# Patient Record
Sex: FEMALE | Race: BLACK OR AFRICAN AMERICAN
[De-identification: names, ages, dates, MRNs, and addresses within clinical notes are randomized per-mention and may not be internally consistent; named-entity substitution may affect disease eponyms.]

---

## 2017-12-07 ENCOUNTER — HOSPITAL ENCOUNTER (OUTPATIENT)
Dept: HOSPITAL 61 - ENDOS | Age: 72
Discharge: HOME | End: 2017-12-07
Attending: INTERNAL MEDICINE
Payer: MEDICARE

## 2017-12-07 VITALS — SYSTOLIC BLOOD PRESSURE: 167 MMHG | DIASTOLIC BLOOD PRESSURE: 75 MMHG

## 2017-12-07 DIAGNOSIS — K31.84: ICD-10-CM

## 2017-12-07 DIAGNOSIS — K57.30: ICD-10-CM

## 2017-12-07 DIAGNOSIS — Z80.0: ICD-10-CM

## 2017-12-07 DIAGNOSIS — E78.5: ICD-10-CM

## 2017-12-07 DIAGNOSIS — Z79.899: ICD-10-CM

## 2017-12-07 DIAGNOSIS — I10: ICD-10-CM

## 2017-12-07 DIAGNOSIS — K29.50: ICD-10-CM

## 2017-12-07 DIAGNOSIS — K64.0: ICD-10-CM

## 2017-12-07 DIAGNOSIS — Z12.11: Primary | ICD-10-CM

## 2017-12-07 DIAGNOSIS — K62.1: ICD-10-CM

## 2017-12-07 PROCEDURE — 43235 EGD DIAGNOSTIC BRUSH WASH: CPT

## 2017-12-07 PROCEDURE — 88305 TISSUE EXAM BY PATHOLOGIST: CPT

## 2017-12-07 RX ADMIN — SODIUM CHLORIDE, SODIUM LACTATE, POTASSIUM CHLORIDE, AND CALCIUM CHLORIDE 1 MLS/HR: .6; .31; .03; .02 INJECTION, SOLUTION INTRAVENOUS at 11:45

## 2019-09-23 ENCOUNTER — HOSPITAL ENCOUNTER (OUTPATIENT)
Dept: HOSPITAL 61 - MRI | Age: 74
Discharge: HOME | End: 2019-09-23
Attending: ORTHOPAEDIC SURGERY
Payer: MEDICARE
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
## 2019-09-23 DIAGNOSIS — M75.21: ICD-10-CM
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
## 2019-09-23 DIAGNOSIS — M89.38: ICD-10-CM

## 2019-09-23 DIAGNOSIS — S46.211A: Primary | ICD-10-CM

## 2019-09-23 PROCEDURE — 73221 MRI JOINT UPR EXTREM W/O DYE: CPT

## 2019-09-23 NOTE — RAD
MR of the right shoulder

 

HISTORY: Pain for 2 months. Limited range of motion.

 

TECHNIQUE: Routine multiplanar sequences are obtained.

 

FINDINGS:

Acromioclavicular joint is mildly degenerative. Postsurgical changes at 

the rotator cuff and humeral head compatible with prior rotator cuff 

surgery. There is some bone hypertrophy and slight fragmentation at the 

greater tuberosity which appears chronic. Although rotator cuff is 

partially obscured by artifact, no evidence of significant recurrent 

supraspinatus or infraspinatus tendon rupture or retraction. Subscapularis

tendinosis with some possible mild interstitial tearing. Small subdeltoid 

bursal effusion. This fluid also enters the acromioclavicular joint.

 

Mild degenerative changes at the glenohumeral joint. Mild heterogeneity of

signal within the superior labrum compatible with degeneration. No 

evidence of labral detachment or separation.

 

Biceps tendinosis with partial tearing. Slight medial subluxation.

 

No acute fracture. No aggressive bone destruction. No acute soft tissue 

abnormality.

 

IMPRESSION:

1. Postsurgical changes of the rotator cuff with tendinosis. No 

significant recurrent supraspinatus or infraspinatus tendon rupture. 

Probable mild interstitial tearing of the subscapularis tendon.

2. Superior labral degeneration.

3. Biceps tendinosis with partial tearing and slight medial subluxation.

 

Electronically signed by: Santosh Gardner MD (9/23/2019 3:50 PM) [facility]

## 2019-10-22 ENCOUNTER — HOSPITAL ENCOUNTER (OUTPATIENT)
Dept: HOSPITAL 61 - SURG | Age: 74
Discharge: HOME | End: 2019-10-22
Attending: ORTHOPAEDIC SURGERY
Payer: MEDICARE

## 2019-10-22 VITALS — BODY MASS INDEX: 25.33 KG/M2 | WEIGHT: 152 LBS | HEIGHT: 65 IN

## 2019-10-22 VITALS — SYSTOLIC BLOOD PRESSURE: 164 MMHG | DIASTOLIC BLOOD PRESSURE: 70 MMHG

## 2019-10-22 DIAGNOSIS — Z98.41: ICD-10-CM

## 2019-10-22 DIAGNOSIS — K64.9: ICD-10-CM

## 2019-10-22 DIAGNOSIS — F17.210: ICD-10-CM

## 2019-10-22 DIAGNOSIS — Z90.710: ICD-10-CM

## 2019-10-22 DIAGNOSIS — M24.011: ICD-10-CM

## 2019-10-22 DIAGNOSIS — H40.9: ICD-10-CM

## 2019-10-22 DIAGNOSIS — Z98.42: ICD-10-CM

## 2019-10-22 DIAGNOSIS — Z96.1: ICD-10-CM

## 2019-10-22 DIAGNOSIS — F19.90: ICD-10-CM

## 2019-10-22 DIAGNOSIS — M66.321: Primary | ICD-10-CM

## 2019-10-22 DIAGNOSIS — E78.00: ICD-10-CM

## 2019-10-22 DIAGNOSIS — K21.9: ICD-10-CM

## 2019-10-22 DIAGNOSIS — Z72.89: ICD-10-CM

## 2019-10-22 DIAGNOSIS — I10: ICD-10-CM

## 2019-10-22 DIAGNOSIS — Z98.890: ICD-10-CM

## 2019-10-22 DIAGNOSIS — Z87.39: ICD-10-CM

## 2019-10-22 PROCEDURE — 23430 REPAIR BICEPS TENDON: CPT

## 2019-10-22 PROCEDURE — 29819 SHO ARTHRS SRG RMVL LOOSE/FB: CPT

## 2019-10-22 PROCEDURE — 93005 ELECTROCARDIOGRAM TRACING: CPT

## 2019-10-22 PROCEDURE — A7015 AEROSOL MASK USED W NEBULIZE: HCPCS

## 2019-10-22 NOTE — PDOC4
Operative Note


Operative Note


Date of surgery: 10/22/2019





Preoperative diagnosis: Right shoulder pain with suspected biceps fraying, 

history of previous rotator cuff repair





Postoperative diagnosis: Same with severe biceps fraying intact rotator cuff 

repair with only partial undersurface fraying and multiple loose bodies 

glenohumeral joint





Operative procedure: Right shoulder arthroscopy removal multiple loose bodies 

and open biceps tenodesis





Surgeon: Rosy





Assist: Marbin





Anesthesia: Gen.





Estimated blood loss: 10 mL





Complications: None





Operative indications: Please see my orthopedic clinic note for detailed 

operative indications and note that we covered addressing any pathological 

conditions found including the possibility of planned biceps tenodesis 

addressing any rotator cuff pathology or other pathology noted at the time of 

the procedure. I did emphasize I can't undo any degenerative condition in the 

shoulder and any ongoing symptoms from that would need to be managed 

symptomatically. There is a possibility of infection nerve or blood vessel 

damage medical or other anesthetic complications among others all her questions 

were answered she wishes to proceed with surgical evaluation and treatment.





Operative text: Patient was identified procedure verified patient placed in the 

supine position on the operating table. After adequate amounts of general 

anesthesia were administered she was placed in the beachchair position and all 

bony prominences were well-padded and the right shoulder was prepped and draped 

in standard sterile fashion with the spider arm rico. After timeout was 

performed patient procedure identified and verified a standard posterior portal 

was established an anterior portal established using spinal needle localization 

and the shoulder joint was systematically examined. She was noted to have mild 

fraying of the undersurface of the supraspinatus but otherwise an intact rotator

cuff repair after the significant fraying of the biceps anchor at the superior 

labrum were debrided back to stable tissue using arthroscopic shaver as well as 

the undersurface tear only partial thickness of the supraspinatus was debrided. 

Subscapularis appeared intact biceps was very frayed involving well over 25% of 

the tendon substance biceps tendon was tagged and tenotomized. She also had 

multiple loose bodies present in the inferior recess of the glenohumeral joint 

which were retrieved through the anterior portal and open incision was made 

anteriorly to complete the biceps tenodesis in the bicipital groove with a 2.9 

juggernaut Bi[employer] anchor. Biceps contour was maintained thorough irrigation 

carried out normal saline solution closure accomplished with buried Vicryl 

suture and subcuticular Monocryl sterile dressings were applied patient was 

placed in a sling extubated returned recovery room in stable condition having 

tolerated procedure well











MONA VALENTINO MD           Oct 22, 2019 13:01

## 2019-10-22 NOTE — EKG
[facility]

               [address] [place] [address]

Test Date:    2019-10-22               Test Time:    08:27:38

Pat Name:     SWETHA AVILA                Department:   

Patient ID:   [id number]           Room:          

Gender:       F                        Technician:   MORAIMA KHAN

:          1945               Requested By: MONA VALENTINO

Order Number: [id number]           Reading MD:     

                                 Measurements

Intervals                              Axis          

Rate:         67                       P:            48

CT:           166                      QRS:          -17

QRSD:         82                       T:            38

QT:           428                                    

QTc:          455                                    

                           Interpretive Statements

SINUS RHYTHM

LEFTWARD AXIS

NO SPECIFIC ECG ABNORMALITIES

RI6.01

No previous ECG available for comparison

## 2019-10-22 NOTE — DISCH
DISCHARGE INSTRUCTIONS


Condition on Discharge


Condition on Discharge:  Stable





Activity After Discharge


Activity Instructions for Disc:  Other, see below (sling for comfort to right 

shoulder and fine motor use of right hand with arm at side may eat do hair no 

lifting pushing pulling)





Diet after Discharge


Diet after Discharge:  Regular





Wound Incision Care


Wound/Incision Care:  Change dressing (remove dressing in 2 days may then 

shower)





Contacting the  after DC


Call your doctor for:  Concerns you may have





Follow-Up


Follow up with:  Dr. Gallegos 1 week











MONA GALLEGOS MD           Oct 22, 2019 12:54

## 2020-07-07 ENCOUNTER — HOSPITAL ENCOUNTER (INPATIENT)
Dept: HOSPITAL 61 - ER | Age: 75
Discharge: HOME | DRG: 149 | End: 2020-07-07
Attending: FAMILY MEDICINE | Admitting: FAMILY MEDICINE
Payer: MEDICARE

## 2020-07-07 VITALS — SYSTOLIC BLOOD PRESSURE: 175 MMHG | DIASTOLIC BLOOD PRESSURE: 70 MMHG

## 2020-07-07 VITALS — DIASTOLIC BLOOD PRESSURE: 55 MMHG | SYSTOLIC BLOOD PRESSURE: 108 MMHG

## 2020-07-07 VITALS — BODY MASS INDEX: 24.98 KG/M2 | WEIGHT: 149.91 LBS | HEIGHT: 65 IN

## 2020-07-07 VITALS — DIASTOLIC BLOOD PRESSURE: 51 MMHG | SYSTOLIC BLOOD PRESSURE: 117 MMHG

## 2020-07-07 DIAGNOSIS — J44.9: ICD-10-CM

## 2020-07-07 DIAGNOSIS — I10: ICD-10-CM

## 2020-07-07 DIAGNOSIS — I73.9: ICD-10-CM

## 2020-07-07 DIAGNOSIS — F17.200: ICD-10-CM

## 2020-07-07 DIAGNOSIS — R00.1: ICD-10-CM

## 2020-07-07 DIAGNOSIS — H81.10: Primary | ICD-10-CM

## 2020-07-07 LAB
ALBUMIN SERPL-MCNC: 4 G/DL (ref 3.4–5)
ALBUMIN/GLOB SERPL: 1 {RATIO} (ref 1–1.7)
ALP SERPL-CCNC: 98 U/L (ref 46–116)
ALT SERPL-CCNC: 19 U/L (ref 14–59)
AMORPH SED URNS QL MICRO: PRESENT /HPF
ANION GAP SERPL CALC-SCNC: 9 MMOL/L (ref 6–14)
APTT PPP: YELLOW S
AST SERPL-CCNC: 24 U/L (ref 15–37)
BACTERIA #/AREA URNS HPF: 0 /HPF
BASOPHILS # BLD AUTO: 0.1 X10^3/UL (ref 0–0.2)
BASOPHILS NFR BLD: 1 % (ref 0–3)
BILIRUB SERPL-MCNC: 0.2 MG/DL (ref 0.2–1)
BILIRUB UR QL STRIP: NEGATIVE
BUN SERPL-MCNC: 12 MG/DL (ref 7–20)
BUN/CREAT SERPL: 11 (ref 6–20)
CALCIUM SERPL-MCNC: 8.9 MG/DL (ref 8.5–10.1)
CHLORIDE SERPL-SCNC: 101 MMOL/L (ref 98–107)
CO2 SERPL-SCNC: 29 MMOL/L (ref 21–32)
CREAT SERPL-MCNC: 1.1 MG/DL (ref 0.6–1)
EOSINOPHIL NFR BLD: 0 % (ref 0–3)
EOSINOPHIL NFR BLD: 0 X10^3/UL (ref 0–0.7)
ERYTHROCYTE [DISTWIDTH] IN BLOOD BY AUTOMATED COUNT: 13.7 % (ref 11.5–14.5)
FIBRINOGEN PPP-MCNC: CLEAR MG/DL
GFR SERPLBLD BASED ON 1.73 SQ M-ARVRAT: 58.9 ML/MIN
GLOBULIN SER-MCNC: 3.9 G/DL (ref 2.2–3.8)
GLUCOSE SERPL-MCNC: 129 MG/DL (ref 70–99)
HCT VFR BLD CALC: 41.8 % (ref 36–47)
HGB BLD-MCNC: 14.5 G/DL (ref 12–15.5)
LYMPHOCYTES # BLD: 2.1 X10^3/UL (ref 1–4.8)
LYMPHOCYTES NFR BLD AUTO: 26 % (ref 24–48)
MCH RBC QN AUTO: 30 PG (ref 25–35)
MCHC RBC AUTO-ENTMCNC: 35 G/DL (ref 31–37)
MCV RBC AUTO: 88 FL (ref 79–100)
MONO #: 0.4 X10^3/UL (ref 0–1.1)
MONOCYTES NFR BLD: 5 % (ref 0–9)
NEUT #: 5.5 X10^3/UL (ref 1.8–7.7)
NEUTROPHILS NFR BLD AUTO: 67 % (ref 31–73)
NITRITE UR QL STRIP: NEGATIVE
PH UR STRIP: 7.5 [PH]
PLATELET # BLD AUTO: 300 X10^3/UL (ref 140–400)
POTASSIUM SERPL-SCNC: 3.3 MMOL/L (ref 3.5–5.1)
PROT SERPL-MCNC: 7.9 G/DL (ref 6.4–8.2)
PROT UR STRIP-MCNC: NEGATIVE MG/DL
RBC # BLD AUTO: 4.78 X10^6/UL (ref 3.5–5.4)
RBC #/AREA URNS HPF: (no result) /HPF (ref 0–2)
SODIUM SERPL-SCNC: 139 MMOL/L (ref 136–145)
SQUAMOUS #/AREA URNS LPF: (no result) /LPF
UROBILINOGEN UR-MCNC: 0.2 MG/DL
WBC # BLD AUTO: 8.1 X10^3/UL (ref 4–11)
WBC #/AREA URNS HPF: (no result) /HPF (ref 0–4)

## 2020-07-07 PROCEDURE — 80053 COMPREHEN METABOLIC PANEL: CPT

## 2020-07-07 PROCEDURE — 83605 ASSAY OF LACTIC ACID: CPT

## 2020-07-07 PROCEDURE — 85025 COMPLETE CBC W/AUTO DIFF WBC: CPT

## 2020-07-07 PROCEDURE — 84484 ASSAY OF TROPONIN QUANT: CPT

## 2020-07-07 PROCEDURE — G0378 HOSPITAL OBSERVATION PER HR: HCPCS

## 2020-07-07 PROCEDURE — 93005 ELECTROCARDIOGRAM TRACING: CPT

## 2020-07-07 PROCEDURE — 70450 CT HEAD/BRAIN W/O DYE: CPT

## 2020-07-07 PROCEDURE — 96374 THER/PROPH/DIAG INJ IV PUSH: CPT

## 2020-07-07 PROCEDURE — 36415 COLL VENOUS BLD VENIPUNCTURE: CPT

## 2020-07-07 PROCEDURE — 81001 URINALYSIS AUTO W/SCOPE: CPT

## 2020-07-07 NOTE — HP
ADMIT DATE:  07/07/2020



CHIEF COMPLAINT:  Dizziness.



HISTORY OF PRESENT ILLNESS:  A 75-year-old black female with history of PAD and

hypertension, came in with a day or two episodic dizziness that sounds like

positional vertigo.  She had nausea and vomiting associated with that, but no

headache, fever, chills, neurologic symptoms or other complaints.  CT head was

negative and she continued to have some vertigo in the ER and was admitted, but

she feels better at this time.



PAST MEDICAL HISTORY:  Well documented in the old record.



MEDICATIONS:  Include amlodipine and hydrochlorothiazide.



ALLERGIES:  No drug allergies are known.  She has chronic neurogenic pain from

previous orthopedic and takes gabapentin for this.



SOCIAL HISTORY:  Still a smoker about half pack a day, smoked most of her adult

life.  She is , has family in the area.  Nondrinker.



FAMILY HISTORY:  Unremarkable.



REVIEW OF SYSTEMS:  No other complaints.



OBJECTIVE:

ENT:  No nystagmus.  Pupils are round and reactive.  EOMs full, otherwise

normal.

NECK:  No carotid bruits, nodes or masses.

LUNGS:  Clear with no tachypnea.

CARDIOVASCULAR:  Regular rate, bradycardia about 50-55, normal for her.  No

murmur.

ABDOMEN:  Soft, benign and nontender.

EXTREMITIES:  Reduced pedal pulses bilaterally, 2-3+ clubbing, no edema.

NEUROLOGIC:  Cranial nerves appeared to be intact as was mentation and limited

some motor and sensory exam.  No tremors were noted.  She was not ambulated to

lessen the vertigo, but no cerebellar signs were noted.



ASSESSMENT:  Symptoms consistent with positional vertigo.  She has controlled

hypertension, peripheral artery disease, chronic tobacco abuse, and chronic

obstructive pulmonary disease.



PLAN:  Continue p.r.n. meclizine for now.  She may go home later today if she

feels better otherwise we will just wait and see how she does.  No further

testing and plan at this point.

 



______________________________

YADY VALLES MD DR:  ROSA/cele  JOB#:  [id number] / [id number]

DD:  07/07/2020 08:19  DT:  07/07/2020 08:35

## 2020-07-07 NOTE — EKG
[facility]

              [address] [place] [address]

Test Date:    2020               Test Time:    01:45:52

Pat Name:     SWETHA AVILA                Department:   

Patient ID:   [id number]           Room:          

Gender:       F                        Technician:   

:          1945               Requested By: SO ANDREWS

Order Number: [id number]           Reading MD:     

                                 Measurements

Intervals                              Axis          

Rate:         52                       P:            40

NM:           164                      QRS:          12

QRSD:         88                       T:            56

QT:           502                                    

QTc:          469                                    

                           Interpretive Statements

SINUS RHYTHM

T ABNORMALITY IN HIGH LATERAL LEADS

ABNORMAL ECG

RI6.02

No previous ECG available for comparison

## 2020-07-07 NOTE — NUR
Discharge Note:



SWETHA AVILA



Discharge instructions and discharge home medications reviewed with patient and a copy 
given. All questions have been answered and understanding verbalized. 



The following instructions and handouts were given:

Mecliziine 25 mg/tab q6 hour prn for dizziness. Patient advised can buy the med over the 
ounter.

Take home meds as directed.

Watch out for severe dizziness, severe weakness, loss of consciousness.

Follow up with Dr. Herbert in a week. 



Discontinued lines and drains:peripheral IV intact, patient tolerated removal, no 
complications noted.



Patient discharged to home with self care via wheelchair at 1251.

## 2020-07-07 NOTE — NUR
SW following. Spoke with RN and reviewed chart. Pt from home and will return at discharge. 
Pt on room air and oral medications. RN reported no SW needs at this time.

## 2020-07-07 NOTE — PDOC
Provider Note


Provider Note


[id number]





Justicifation of Admission Dx:


Justifications for Admission:


Justification of Admission Dx:  Yes











YADY VALLES MD              Jul 7, 2020 08:19

## 2020-07-07 NOTE — NUR
Notified Dr. Herbert that patient wants to go home, bradycardic on the monitor at 40bpm, 
currently asymptomatic. This nurse was instructed that patient can be discharged, patient's 
bradycardia being ff up at clinic.

## 2020-07-07 NOTE — RAD
CT HEAD WO CONTRAST 

 

Date: 7/7/2020 1:15 AM 

 

Clinical Indication: Reason: DIZZINESS / Spl. Instructions:  / History: 

 

Comparison: None.

 

Technique:  5 mm axial tomographic images were obtained of the head 

without contrast. These were viewed on brain and bone windows. One or more

of the following dose reduction techniques were utilized: Automated 

exposure control (AEC), Adjustment of mA and/or kV according to patient 

size, Use of iterative reconstruction technique such as ASiR, CT scan done

according to ALARA and image gently/image wisely

 

Findings:

 

Mild nonspecific periventricular hypoattenuation, most commonly seen with 

chronic small vessel ischemic disease. Calcified atherosclerosis of the 

bilateral cavernous and paraclinoid internal carotid arteries and 

intracranial vertebral arteries.

 

No intra- or extra-axial mass or fluid collection. No acute hemorrhage. 

The ventricles are normal in size, shape, and morphology. The gray-white 

matter junction is normal. The subarachnoid cisterns are patent. 

 

The visualized paranasal sinuses are normal.  The visualized portions of 

the orbits and globes are normal. The mastoid air cells are clear. 

 

The  topogram shows no lytic lesion or fracture. 

 

Impression:

 

No acute intracranial process.

 

Mild chronic small vessel ischemic disease.

 

Electronically signed by: Cain Ponce MD (7/7/2020 2:02 AM) [facility]

## 2020-07-07 NOTE — PHYS DOC
General Adult


EDM:


Chief Complaint:  DIZZY/LIGHT HEADED





HPI:


HPI:





Patient is a 75  year old female who presents to the ED with a chief complaint 

of dizziness.  Patient states that she went to bed and when she woke up she sat 

up in her bed and felt like the whole room was spinning.  Patient also had 

episode of vomiting and felt lightheaded.  Patient denies headache, chest pain, 

fever, chills, shortness of breath.  Patient denies previous symptoms like this.





Review of Systems:


Review of Systems:


Constitutional:   Denies fever or chills. []


Eyes:   Denies change in visual acuity. []


HENT:   Denies nasal congestion or sore throat. [] 


Respiratory:   Denies cough or shortness of breath. [] 


Cardiovascular:   Denies chest pain or edema. [] 


GI:   Denies abdominal pain, nausea, vomiting, bloody stools or diarrhea. [] 


:  Denies dysuria. [] 


Neurologic:   Complains of dizziness and vertigo-like symptoms





Heart Score:


Risk Factors:


Risk Factors:  DM, Current or recent (<one month) smoker, HTN, HLP, family 

history of CAD, obesity.


Risk Scores:


Score 0 - 3:  2.5% MACE over next 6 weeks - Discharge Home


Score 4 - 6:  20.3% MACE over next 6 weeks - Admit for Clinical Observation


Score 7 - 10:  72.7% MACE over next 6 weeks - Early Invasive Strategies





Current Medications:





Current Medications








 Medications


  (Trade)  Dose


 Ordered  Sig/Johanna  Start Time


 Stop Time Status Last Admin


Dose Admin


 


 Diazepam


  (Valium)  2 mg  1X  ONCE  7/7/20 01:30


 7/7/20 01:31   





 


 Meclizine HCl


  (Antivert)  25 mg  1X  ONCE  7/7/20 01:30


 7/7/20 01:31   














Allergies:


Allergies:





Allergies








Coded Allergies Type Severity Reaction Last Updated Verified


 


  No Known Drug Allergies    10/18/19 No











Physical Exam:


PE:





Constitutional: Well developed, well nourished, no acute distress, non-toxic 

appearance. 


HENT: Normocephalic, atraumatic


Eyes: EOMI


Neck: Normal range of motion, Supple


Cardiovascular:Heart rate regular rhythm


Lungs & Thorax:  Bilateral breath sounds clear to auscultation []


Abdomen: Bowel sounds normal, soft, no tenderness


Extremities: No tenderness, ROM intact


Neurologic: Alert and oriented X 3, no focal neuro deficits on exam





Current Patient Data:


Labs:





                                Laboratory Tests








Test


 7/7/20


01:10


 


Urine Collection Type Unknown  


 


Urine Color Yellow  


 


Urine Clarity Clear  


 


Urine pH


 7.5 (<5.0-8.0)





 


Urine Specific Gravity


 1.010


(1.000-1.030)


 


Urine Protein


 Negative mg/dL


(NEG-TRACE)


 


Urine Glucose (UA)


 Negative mg/dL


(NEG)


 


Urine Ketones (Stick)


 Negative mg/dL


(NEG)


 


Urine Blood


 Negative (NEG)





 


Urine Nitrite


 Negative (NEG)





 


Urine Bilirubin


 Negative (NEG)





 


Urine Urobilinogen Dipstick


 0.2 mg/dL (0.2


mg/dL)


 


Urine Leukocyte Esterase


 Negative (NEG)





 


Urine RBC


 Occ /HPF (0-2)





 


Urine WBC


 Occ /HPF (0-4)





 


Urine Squamous Epithelial


Cells Few /LPF  





 


Urine Amorphous Sediment Present /HPF  


 


Urine Bacteria


 0 /HPF (0-FEW)





 


Urine Mucus Slight /LPF  











EKG:


EKG:


[EKG interpretation


1:45 AM on 7/7/2020


HR: 52


Sinus rhythm


Regular intervals


Normal axis


Nonspecific ST changes





]





Radiology/Procedures:


Radiology/Procedures:


[]


Impression:


CT HEAD


Findings:


 


Mild nonspecific periventricular hypoattenuation, most commonly seen with 


chronic small vessel ischemic disease. Calcified atherosclerosis of the 


bilateral cavernous and paraclinoid internal carotid arteries and 


intracranial vertebral arteries.


 


No intra- or extra-axial mass or fluid collection. No acute hemorrhage. 


The ventricles are normal in size, shape, and morphology. The gray-white 


matter junction is normal. The subarachnoid cisterns are patent. 


 


The visualized paranasal sinuses are normal.  The visualized portions of 


the orbits and globes are normal. The mastoid air cells are clear. 


 


The  topogram shows no lytic lesion or fracture. 


 


Impression:


 


No acute intracranial process.


 


Mild chronic small vessel ischemic disease.





Course & Med Decision Making:


Course & Med Decision Making


Pertinent Labs and Imaging studies reviewed. (See chart for details)





Patient states that when she moves her eyes from side to side her symptoms are 

getting worse.





I have ordered patient to get Antivert and Valium in the ER.





Also ordered labs, CT head, EKG, troponin, UA.





UA does not show UTI.





Labs are within normal limits.





Patient states that she is feeling better after the medication.





Got the patient to walk to the bathroom and patient almost fell.


States that the dizziness is back when she moves her head from side to side





Discussed results and plan of care with patient and family.





Discussed case with Dr. Herbert who accepts admission.





Dragon Disclaimer:


Dragon Disclaimer:


This electronic medical record was generated, in whole or in part, using a voice

 recognition dictation system.





Departure


Departure


Impression:  


   Primary Impression:  


   Dizziness


   Additional Impression:  


   Vertigo


Disposition:  09 ADMITTED AS INPATIENT


Admitting Physician:  Kyle Herbert


Condition:  GOOD


Referrals:  


KYLE HERBERT MD (PCP)





Justicifation of Admission Dx:


Justifications for Admission:


Justification of Admission Dx:  Yes


Comments:


DIZZINESS


VERTIGO











SO ANDREWS DO            Jul 7, 2020 01:31

## 2020-07-07 NOTE — DS
DATE OF DISCHARGE:  



HOSPITAL SUMMARY:  The patient came in with what sounds like classic positional

vertigo with sudden onset.  She had a CT scan in the ER, which was clear as well

as her lab work.  When she was unable to shake the vertigo, she was admitted. 

She was monitored and had no further vertigo, though she did have some sinus

bradycardia in the 40s without symptoms from that.  She is comfortable to be

followed as an outpatient at this point.



FINAL DIAGNOSES:

1.  Acute positional vertigo, improved.

2.  Sinus bradycardia, etiology undetermined.

3.  Hypertension, chronic.

4.  Tobacco abuse.



OPERATIONS, PROCEDURES, COMPLICATIONS, CONSULTATIONS:  None.



DISPOSITION:  Continue all meds the same.  Office followup with Dr. Herbert in 1

week regarding the bradycardia to consider monitor of thyroid testing, etc. 

Meclizine to be used t.i.d. p.r.n. for vertigo.



PROGNOSIS:  Good.

 



______________________________

YADY HERBERT MD



DR:  ROSA/cele  JOB#:  [id number] / [id number]

DD:  07/07/2020 12:39  DT:  07/07/2020 13:06